# Patient Record
Sex: MALE | Race: OTHER
[De-identification: names, ages, dates, MRNs, and addresses within clinical notes are randomized per-mention and may not be internally consistent; named-entity substitution may affect disease eponyms.]

---

## 2020-01-05 ENCOUNTER — HOSPITAL ENCOUNTER (EMERGENCY)
Dept: HOSPITAL 72 - EMR | Age: 55
Discharge: HOME | End: 2020-01-05
Payer: SELF-PAY

## 2020-01-05 VITALS — SYSTOLIC BLOOD PRESSURE: 130 MMHG | DIASTOLIC BLOOD PRESSURE: 71 MMHG

## 2020-01-05 VITALS — DIASTOLIC BLOOD PRESSURE: 78 MMHG | SYSTOLIC BLOOD PRESSURE: 120 MMHG

## 2020-01-05 VITALS — BODY MASS INDEX: 30.16 KG/M2 | WEIGHT: 235 LBS | HEIGHT: 74 IN

## 2020-01-05 DIAGNOSIS — R31.9: Primary | ICD-10-CM

## 2020-01-05 DIAGNOSIS — E78.5: ICD-10-CM

## 2020-01-05 DIAGNOSIS — M79.605: ICD-10-CM

## 2020-01-05 DIAGNOSIS — E11.40: ICD-10-CM

## 2020-01-05 DIAGNOSIS — Z96.641: ICD-10-CM

## 2020-01-05 DIAGNOSIS — I11.9: ICD-10-CM

## 2020-01-05 DIAGNOSIS — Z88.8: ICD-10-CM

## 2020-01-05 DIAGNOSIS — Z95.5: ICD-10-CM

## 2020-01-05 DIAGNOSIS — I25.10: ICD-10-CM

## 2020-01-05 LAB
ADD MANUAL DIFF: YES
ALBUMIN SERPL-MCNC: 3 G/DL (ref 3.4–5)
ALBUMIN/GLOB SERPL: 0.8 {RATIO} (ref 1–2.7)
ALP SERPL-CCNC: 41 U/L (ref 46–116)
ALT SERPL-CCNC: 23 U/L (ref 12–78)
ANION GAP SERPL CALC-SCNC: 10 MMOL/L (ref 5–15)
APPEARANCE UR: (no result)
APTT BLD: 33 SEC (ref 23–33)
APTT PPP: (no result) S
AST SERPL-CCNC: 17 U/L (ref 15–37)
BILIRUB SERPL-MCNC: 0.9 MG/DL (ref 0.2–1)
BUN SERPL-MCNC: 25 MG/DL (ref 7–18)
CALCIUM SERPL-MCNC: 8.4 MG/DL (ref 8.5–10.1)
CHLORIDE SERPL-SCNC: 103 MMOL/L (ref 98–107)
CK SERPL-CCNC: 34 U/L (ref 26–308)
CO2 SERPL-SCNC: 25 MMOL/L (ref 21–32)
CREAT SERPL-MCNC: 1.2 MG/DL (ref 0.55–1.3)
ERYTHROCYTE [DISTWIDTH] IN BLOOD BY AUTOMATED COUNT: 12 % (ref 11.6–14.8)
GLOBULIN SER-MCNC: 3.9 G/DL
GLUCOSE UR STRIP-MCNC: (no result) MG/DL
HCT VFR BLD CALC: 39.2 % (ref 42–52)
HGB BLD-MCNC: 13.4 G/DL (ref 14.2–18)
INR PPP: 0.9 (ref 0.9–1.1)
KETONES UR QL STRIP: (no result)
LEUKOCYTE ESTERASE UR QL STRIP: NEGATIVE
MCV RBC AUTO: 90 FL (ref 80–99)
NITRITE UR QL STRIP: NEGATIVE
PH UR STRIP: 5 [PH] (ref 4.5–8)
PLATELET # BLD: 121 K/UL (ref 150–450)
POTASSIUM SERPL-SCNC: 3.9 MMOL/L (ref 3.5–5.1)
PROT UR QL STRIP: (no result)
RBC # BLD AUTO: 4.33 M/UL (ref 4.7–6.1)
SODIUM SERPL-SCNC: 138 MMOL/L (ref 136–145)
SP GR UR STRIP: 1.02 (ref 1–1.03)
UROBILINOGEN UR-MCNC: NORMAL MG/DL (ref 0–1)
WBC # BLD AUTO: 7.2 K/UL (ref 4.8–10.8)

## 2020-01-05 PROCEDURE — 84484 ASSAY OF TROPONIN QUANT: CPT

## 2020-01-05 PROCEDURE — 80053 COMPREHEN METABOLIC PANEL: CPT

## 2020-01-05 PROCEDURE — 85007 BL SMEAR W/DIFF WBC COUNT: CPT

## 2020-01-05 PROCEDURE — 85730 THROMBOPLASTIN TIME PARTIAL: CPT

## 2020-01-05 PROCEDURE — 86710 INFLUENZA VIRUS ANTIBODY: CPT

## 2020-01-05 PROCEDURE — 93971 EXTREMITY STUDY: CPT

## 2020-01-05 PROCEDURE — 99284 EMERGENCY DEPT VISIT MOD MDM: CPT

## 2020-01-05 PROCEDURE — 82550 ASSAY OF CK (CPK): CPT

## 2020-01-05 PROCEDURE — 85025 COMPLETE CBC W/AUTO DIFF WBC: CPT

## 2020-01-05 PROCEDURE — 96361 HYDRATE IV INFUSION ADD-ON: CPT

## 2020-01-05 PROCEDURE — 85610 PROTHROMBIN TIME: CPT

## 2020-01-05 PROCEDURE — 81003 URINALYSIS AUTO W/O SCOPE: CPT

## 2020-01-05 PROCEDURE — 96374 THER/PROPH/DIAG INJ IV PUSH: CPT

## 2020-01-05 PROCEDURE — 36415 COLL VENOUS BLD VENIPUNCTURE: CPT

## 2020-01-05 PROCEDURE — 93005 ELECTROCARDIOGRAM TRACING: CPT

## 2020-01-05 NOTE — NUR
ER DISCHARGE NOTE:

Patient is cleared to be discharged per ERMD, pt is aox4, on room air, with 
stable vital signs. pt was given dc  instructions, pt was able to verbalize 
understanding, pt id band and iv site removed without complications. pt is able 
to ambulate with steady gait. pt took all belongings. Pt instructed to see a MD 
asap for leg and urine.

## 2020-01-05 NOTE — DIAGNOSTIC IMAGING REPORT
Indication: Left leg pain

 

Technique: Grayscale and duplex images of the left lower extremity veins

 

Comparison: none

 

Findings: Grayscale and duplex images demonstrate no evidence of intraluminal

thrombus. Normal phasic Doppler waveforms, demonstrating normal augmentation response

and no evidence of valvular insufficiency. Normal compressibility

 

Impression: Negative for evidence of left lower extremity deep venous thrombosis

 

This agrees with the preliminary interpretation provided overnight by [facility]

teleradiology service.

## 2020-01-05 NOTE — EMERGENCY ROOM REPORT
History of Present Illness


General


Chief Complaint:  Pain


Source:  Patient





Present Illness


HPI


Disclaimer: Please note that this report is being documented using [employer]AG[employer] 

technology. This can lead to erroneous entry secondary to incorrect 

interpretation by the dictating instrument.





HPI: 54-year-old male with history of diabetes, CAD status post stenting, 

hypertension, hyperlipidemia presents for evaluation of leg pain and fevers.  

Symptoms began yesterday morning.  No trauma was reported but he began 

complaining of pain in the left side of the groin that has been migrating down 

the left leg now settled behind the knee and into the calf.  He is no longer 

able to ambulate because of the pain.  He is noted subjective fevers over the 

past 24 hours.  Otherwise has been in his usual state of health.  Denies any 

recent fall or injury.  Denies URI symptoms, cough, chest pain, shortness of 

breath, vomiting, diarrhea.  Denies any rash, vesicles, skin breakdown.  Does 

not take blood thinners.  No history of DVT or PE.


 


PMH: Hypertension, hyperlipidemia, diabetes, CAD


 


PSH: Right hip replacement


 


Allergies: Duloxetine, Wellbutrin


 


Social Hx: Denies drug, alcohol or tobacco use


Allergies:  


Coded Allergies:  


     BUPROPION HCL (Verified  Allergy, Intermediate, 9/13/13)


 AGITATION


     DULOXETINE HCL (Verified  Allergy, Mild, 9/13/13)


 RASH





Nursing Documentation-PMH


Past Medical History:  No History, Except For


Hx Cardiac Problems:  Yes - 3 stents


Hx Hypertension:  No


Hx Pacemaker:  No


Hx Asthma:  No


Hx COPD:  No


Hx Diabetes:  Yes - diabetic neuropathy, retinopathy


Hx Cancer:  No


Hx Gastrointestinal Problems:  No


Hx Dialysis:  No


History Of Psychiatric Problem:  No


Hx Neurological Problems:  Yes


Hx Cerebrovascular Accident:  No


Hx Seizures:  No





Review of Systems


All Other Systems:  negative except mentioned in HPI





Physical Exam





Vital Signs








  Date Time  Temp Pulse Resp B/P (MAP) Pulse Ox O2 Delivery O2 Flow Rate FiO2


 


1/5/20 14:14 99.9 112 20 123/76 (92) 93 Room Air  





 





General: Awake and alert, no acute distress, afebrile


HEENT: NC/AT. EOMI. dry mucous membranes


Cardiovascular: Tachycardic.  S1 and S2 normal.  No murmur appreciated


Resp: Normal work of breathing. No cough, wheezing or crackles appreciated


Abdomen: Abdomen is soft, nondistended.  Nontender.  No masses, no abdominal 

wall defect palpable in the left inguinal region.


Skin: Intact.  No abrasions, laceration or rash over the exposed skin.  No skin 

breakdown, no vesicles, no erythema, no warmth.


MSK: Normal tone and bulk. Moving all extremities.  No obvious deformity.  

Significant tenderness in the popliteal fossa and over the left calf.  No 

asymmetry.  Sensations intact distally.  Capillary refill is brisk.  2+ PT/DP 

pulses.  2+ femoral pulses.


Neuro: Awake and alert.  Mentating appropriately.





Medical Decision Making


Diagnostic Impression:  


 Primary Impression:  


 Microscopic hematuria


 Additional Impression:  


 Leg pain


ER Course


This is a 54-year-old male history of CAD, hypertension, hyperlipidemia and 

diabetes presents for evaluation of atraumatic left lower extremity pain as 

well as subjective fevers.  Patient is tachycardic but in no acute distress 

otherwise.  He does have asymmetric tenderness without swelling in the left 

lower extremity in the popliteal fossa and over the left calf with intact 

distal pulses and good perfusion.  Differential includes but not limited to 

worsening neuropathy, cellulitis, zoster, ischemia, DVT. Will start broad 

metabolic infectious work-up, treat with Toradol for pain, obtain a DVT study 

of the lower extremity.





Laboratory Tests








Test


  1/5/20


14:55 1/5/20


15:25


 


White Blood Count


  7.2 K/UL


(4.8-10.8) 


 


 


Red Blood Count


  4.33 M/UL


(4.70-6.10)  L 


 


 


Hemoglobin


  13.4 G/DL


(14.2-18.0)  L 


 


 


Hematocrit


  39.2 %


(42.0-52.0)  L 


 


 


Mean Corpuscular Volume 90 FL (80-99)   


 


Mean Corpuscular Hemoglobin


  30.8 PG


(27.0-31.0) 


 


 


Mean Corpuscular Hemoglobin


Concent 34.1 G/DL


(32.0-36.0) 


 


 


Red Cell Distribution Width


  12.0 %


(11.6-14.8) 


 


 


Platelet Count


  121 K/UL


(150-450)  L 


 


 


Mean Platelet Volume


  8.1 FL


(6.5-10.1) 


 


 


Neutrophils (%) (Auto)


  % (45.0-75.0)


  


 


 


Lymphocytes (%) (Auto)


  % (20.0-45.0)


  


 


 


Monocytes (%) (Auto)  % (1.0-10.0)   


 


Eosinophils (%) (Auto)  % (0.0-3.0)   


 


Basophils (%) (Auto)  % (0.0-2.0)   


 


Neutrophils % (Manual) Pending   


 


Lymphocytes % (Manual) Pending   


 


Platelet Estimate Pending   


 


Platelet Morphology Pending   


 


Prothrombin Time


  10.0 SEC


(9.30-11.50) 


 


 


Prothrombin Time INR 0.9 (0.9-1.1)   


 


PTT


  33 SEC (23-33)


  


 


 


Sodium Level


  138 MMOL/L


(136-145) 


 


 


Potassium Level


  3.9 MMOL/L


(3.5-5.1) 


 


 


Chloride Level


  103 MMOL/L


() 


 


 


Carbon Dioxide Level


  25 MMOL/L


(21-32) 


 


 


Anion Gap


  10 mmol/L


(5-15) 


 


 


Blood Urea Nitrogen


  25 mg/dL


(7-18)  H 


 


 


Creatinine


  1.2 MG/DL


(0.55-1.30) 


 


 


Estimate Glomerular


Filtration Rate > 60 mL/min


(>60) 


 


 


Glucose Level


  167 MG/DL


()  H 


 


 


Calcium Level


  8.4 MG/DL


(8.5-10.1)  L 


 


 


Total Bilirubin


  0.9 MG/DL


(0.2-1.0) 


 


 


Aspartate Amino Transferase


(AST) 17 U/L (15-37)


  


 


 


Alanine Aminotransferase (ALT)


  23 U/L (12-78)


  


 


 


Alkaline Phosphatase


  41 U/L


()  L 


 


 


Total Creatine Kinase


  34 U/L


() 


 


 


Troponin I


  0.000 ng/mL


(0.000-0.056) 


 


 


Total Protein


  6.9 G/DL


(6.4-8.2) 


 


 


Albumin


  3.0 G/DL


(3.4-5.0)  L 


 


 


Globulin 3.9 g/dL   


 


Albumin/Globulin Ratio


  0.8 (1.0-2.7)


L 


 


 


Urine Color  Pale yellow  


 


Urine Appearance


  


  Slightly


cloudy


 


Urine pH  5 (4.5-8.0)  


 


Urine Specific Gravity


  


  1.020


(1.005-1.035)


 


Urine Protein


  


  4+ (NEGATIVE)


H


 


Urine Glucose (UA)


  


  4+ (NEGATIVE)


H


 


Urine Ketones


  


  2+ (NEGATIVE)


H


 


Urine Blood


  


  4+ (NEGATIVE)


H


 


Urine Nitrite


  


  Negative


(NEGATIVE)


 


Urine Bilirubin


  


  Negative


(NEGATIVE)


 


Urine Urobilinogen


  


  Normal MG/DL


(0.0-1.0)


 


Urine Leukocyte Esterase


  


  Negative


(NEGATIVE)


 


Urine RBC


  


  15-20 /HPF (0


- 0)  H


 


Urine WBC


  


  0-2 /HPF (0 -


0)


 


Urine Squamous Epithelial


Cells 


  Occasional


/LPF


 


Urine Bacteria


  


  Few /HPF


(NONE)


 


Urine Mucus


  


  Moderate /LPF


(NONE/OCC)  H








Microbiology








 Date/Time


Source Procedure


Growth Status


 


 


 1/5/20 15:25


Nasal Nares - Final Complete


 


 1/5/20 15:25


Nasal Nares - Final Complete








EKG Diagnostic Results


EKG Time:  15:01


Rate:  tachycardiac


Rhythm:  NSR


ST Segments:  no acute changes


Other Impression


Sinus rhythm, normal axis, normal intervals, no ST segment changes.  Inferior Q 

waves.





Rhythm Strip Diag. Results


Rhythm Strip Time:  15:01


EP Interpretation:  yes


Rate:  100


Rhythm:  NSR, no PVC's, no ectopy





CT/MRI/US Diagnostic Results


CT/MRI/US Diagnostic Results :  


   Impression


Preliminary Findings Only  See Final Report For Complete Findings 


US VENOUS LEFT LOWER EXTREMITY:





No DVT 











Radiologist: Kaden Box M.D. Phone: [phone]








Reevaluation Time:  16:46





Last Vital Signs








  Date Time  Temp Pulse Resp B/P (MAP) Pulse Ox O2 Delivery O2 Flow Rate FiO2


 


1/5/20 14:14 99.9 112 20 123/76 (92) 93 Room Air  








Reevaluation Impression


Lower extremity Doppler finds no evidence of DVT or other abnormalities.  Labs 

have returned largely within normal limits aside from microscopic hematuria.  

The patient has no history of this.  He is well-appearing and states the 

Toradol significantly improved his pain.  He will be discharged to follow-up 

with his PMD on an outpatient basis to investigate the hematuria and for his 

leg pain.  Will treat with NSAIDs which he states he has at home.  Discussed 

reasons to return to the emergency department need for close follow-up.  He 

understands and agrees with this treatment plan will be discharged home.


Disposition:  HOME, SELF-CARE


Condition:  Improved











Juan Bee MD Jan 5, 2020 14:44

## 2020-01-05 NOTE — NUR
ED Nurse Note:

Pt walked into ED w/ c/o L leg pain since yesterday 10/10. Pt states he's 
unable to move leg. ROM 2/5 L leg. Pt ext warm to touch, no erythema. Pt alert 
and orientedx4. Pt set up on monitor. EKG taken. Labs drawn and sent. MD has 
seen patient.